# Patient Record
Sex: FEMALE | URBAN - METROPOLITAN AREA
[De-identification: names, ages, dates, MRNs, and addresses within clinical notes are randomized per-mention and may not be internally consistent; named-entity substitution may affect disease eponyms.]

---

## 2021-02-18 ENCOUNTER — OFFICE VISIT (OUTPATIENT)
Dept: URBAN - METROPOLITAN AREA CLINIC 48 | Facility: CLINIC | Age: 46
End: 2021-02-18
Payer: COMMERCIAL

## 2021-02-18 PROCEDURE — 92004 COMPRE OPH EXAM NEW PT 1/>: CPT | Performed by: OPTOMETRIST

## 2021-02-18 RX ORDER — CYCLOPENTOLATE HYDROCHLORIDE 10 MG/ML
1 % SOLUTION/ DROPS OPHTHALMIC
Qty: 2.5 | Refills: 1 | Status: ACTIVE
Start: 2021-02-18

## 2021-02-18 RX ORDER — NETARSUDIL 0.2 MG/ML
0.02 % SOLUTION/ DROPS OPHTHALMIC; TOPICAL
Qty: 2.5 | Refills: 0 | Status: ACTIVE
Start: 2021-02-18

## 2021-02-18 RX ORDER — PREDNISOLONE ACETATE 10 MG/ML
1 % SUSPENSION/ DROPS OPHTHALMIC
Qty: 5 | Refills: 1 | Status: ACTIVE
Start: 2021-02-18

## 2021-02-18 ASSESSMENT — INTRAOCULAR PRESSURE
OD: 29
OS: 15

## 2021-02-18 NOTE — IMPRESSION/PLAN
Impression: Primary iridocyclitis, right eye: H20.011. Plan: Continue Jack/alonso/Brim BID OD Bromfenac BID Increase Prednisolone Qhr

Start Rhopressa (sample given in office) Cyclpentolate BID

RTC tomorrow, if no improvement, will consider diamox at that time

## 2021-02-19 ENCOUNTER — OFFICE VISIT (OUTPATIENT)
Dept: URBAN - METROPOLITAN AREA CLINIC 48 | Facility: CLINIC | Age: 46
End: 2021-02-19
Payer: COMMERCIAL

## 2021-02-19 DIAGNOSIS — H20.011 PRIMARY IRIDOCYCLITIS, RIGHT EYE: Primary | ICD-10-CM

## 2021-02-19 PROCEDURE — 92012 INTRM OPH EXAM EST PATIENT: CPT | Performed by: OPTOMETRIST

## 2021-02-19 RX ORDER — VALACYCLOVIR HYDROCHLORIDE 1 G/1
TABLET, FILM COATED ORAL
Qty: 21 | Refills: 0 | Status: INACTIVE
Start: 2021-02-19 | End: 2021-02-25

## 2021-02-19 ASSESSMENT — INTRAOCULAR PRESSURE
OD: 18
OS: 13

## 2021-02-19 NOTE — IMPRESSION/PLAN
Impression: Primary iridocyclitis, right eye: H20.011. Plan: Patient had shingles on her head three weeks ago, this is likely Varicela. Continue Jack/alonso/Brim BID OD, Bromfenac BID OD,  Rhopressa sample provided last ov,  Cyclpentolate BID,  Prednisolone Q1H until Sunday decrease to Q2H unles condition is not improving then pt. can stay on Q1H until next ov. Start Valtrex 1 gram TID  oral tab [place].  

RTC Monday or Tuesday

## 2021-03-04 ENCOUNTER — OFFICE VISIT (OUTPATIENT)
Dept: URBAN - METROPOLITAN AREA CLINIC 48 | Facility: CLINIC | Age: 46
End: 2021-03-04
Payer: COMMERCIAL

## 2021-03-04 PROCEDURE — 99213 OFFICE O/P EST LOW 20 MIN: CPT | Performed by: OPTOMETRIST

## 2021-03-04 ASSESSMENT — INTRAOCULAR PRESSURE
OS: 14
OD: 19

## 2021-03-04 NOTE — IMPRESSION/PLAN
Impression: Primary iridocyclitis, right eye: H20.011. Most likely Varicela. Plan: Discussed with patient. Continue dorz-alonso-brimonidine BID Bromfenac BID Cycophentalate BID Prednisolone [id number] x4 days, then decrease to [id number] RTC : 1 week for follow up 

recommend holding off on lab work for now. Consider at next visit:
RPR, FTA-ABS, Lyme Titer, ESR, HLA-B27, Energy Panel